# Patient Record
Sex: FEMALE | URBAN - METROPOLITAN AREA
[De-identification: names, ages, dates, MRNs, and addresses within clinical notes are randomized per-mention and may not be internally consistent; named-entity substitution may affect disease eponyms.]

---

## 2020-01-21 ENCOUNTER — APPOINTMENT (OUTPATIENT)
Age: 65
Setting detail: DERMATOLOGY
End: 2020-01-21

## 2020-01-21 VITALS — HEIGHT: 65 IN | WEIGHT: 215 LBS | RESPIRATION RATE: 16 BRPM

## 2020-01-21 DIAGNOSIS — L82.1 OTHER SEBORRHEIC KERATOSIS: ICD-10-CM

## 2020-01-21 PROCEDURE — OTHER COUNSELING: OTHER

## 2020-01-21 PROCEDURE — 99202 OFFICE O/P NEW SF 15 MIN: CPT

## 2020-01-21 ASSESSMENT — LOCATION DETAILED DESCRIPTION DERM
LOCATION DETAILED: RIGHT INFERIOR LATERAL FOREHEAD
LOCATION DETAILED: LEFT LATERAL TEMPLE
LOCATION DETAILED: RIGHT LATERAL ZYGOMA
LOCATION DETAILED: RIGHT INFERIOR FOREHEAD

## 2020-01-21 ASSESSMENT — LOCATION SIMPLE DESCRIPTION DERM
LOCATION SIMPLE: RIGHT FOREHEAD
LOCATION SIMPLE: LEFT TEMPLE
LOCATION SIMPLE: RIGHT ZYGOMA

## 2020-01-21 ASSESSMENT — LOCATION ZONE DERM: LOCATION ZONE: FACE

## 2020-09-29 ENCOUNTER — TRANSFERRED RECORDS (OUTPATIENT)
Dept: PHYSICAL THERAPY | Facility: CLINIC | Age: 65
End: 2020-09-29

## 2020-10-01 ENCOUNTER — THERAPY VISIT (OUTPATIENT)
Dept: PHYSICAL THERAPY | Facility: CLINIC | Age: 65
End: 2020-10-01
Payer: COMMERCIAL

## 2020-10-01 DIAGNOSIS — M53.3 SI (SACROILIAC) JOINT DYSFUNCTION: ICD-10-CM

## 2020-10-01 DIAGNOSIS — M51.369 DDD (DEGENERATIVE DISC DISEASE), LUMBAR: ICD-10-CM

## 2020-10-01 PROCEDURE — 97140 MANUAL THERAPY 1/> REGIONS: CPT | Mod: GP | Performed by: PHYSICAL THERAPIST

## 2020-10-01 PROCEDURE — 97110 THERAPEUTIC EXERCISES: CPT | Mod: GP | Performed by: PHYSICAL THERAPIST

## 2020-10-01 PROCEDURE — 97162 PT EVAL MOD COMPLEX 30 MIN: CPT | Mod: GP | Performed by: PHYSICAL THERAPIST

## 2020-10-01 NOTE — PROGRESS NOTES
[facility] Initial Evaluation  Subjective:  The history is provided by the patient. No  was used.   Therapist Generated HPI Evaluation  Problem details: 9/29/20 returned to the MD after diagnostics of her low back and L hip. MRI reviels Moderate arthritis and multilevel bulging discs..         Type of problem:  Lumbar.    This is a new condition.  Condition occurred with:  A fall/slip.    Patient reports pain:  Lumbar spine left and central lumbar spine.    Pain radiates to:  Gluteals left.     Associated symptoms:  Loss of motion/stiffness. Symptoms are exacerbated by twisting, sitting, walking and bending (transitional motions)  and relieved by rest and NSAID's.          Patient Health History  Shelby Alves being seen for 9/29/20.          Pain score: 0-5/10.  General health as reported by patient is good.  Pertinent medical history includes: diabetes, implanted device, osteoarthritis and overweight.   Red flags:  None as reported by patient.  Medical allergies: other. Other medical allergies details: prednisone.   Surgeries include:  Orthopedic surgery and other. Other surgery history details: B TKA, 3 C-sections, gallbladder and ovarian cyst removed.    Current medications:  Other, anti-inflammatory, high blood pressure medication and thyroid medication. Other medications details: insulin.    Current occupation is retired.                                       Objective:  Standing Alignment:        Lumbar:  Lordosis decr                           Lumbar/SI Evaluation  ROM:    AROM Lumbar:   Flexion:          Hands to feel - no pain  Ext:                    100%- no pain   Side Bend:        Left:  100% - no pain    Right:  100% - no pain  Rotation:           Left:     Right:   Side Glide:        Left:     Right:           Lumbar Myotomes:  Lumbar myotomes: no radicular sx reported.                  Neural Tension/Mobility:    Left side:  SLR positive.  Left  side:SLR w/DF  negative.     Lumbar Palpation:    Tenderness present at Left:    Erector Spinae and PSIS      Lumbar Provocation:      Left negative with:  PROM hip      SI joint/Sacrum:    Standing alignment: L iliac crest and PSIS high  March test: decreased L ant ilial rotation  Supine alignment: L LE appears long; ASIS low  Neg SI and hip compression  Prone alignment: L PSIS high  S/S consistent with a L ilial upslip and anterior rotation; corrected easily with MET                                                         General     ROS    Assessment/Plan:    Patient is a 65 year old female with lumbar and sacral complaints.    Patient has the following significant findings with corresponding treatment plan.                Diagnosis 1:  Lumbar DDD and SI dysfunction  Pain -  hot/cold therapy, manual therapy, splint/taping/bracing/orthotics and home program  Decreased ROM/flexibility - manual therapy and therapeutic exercise  Decreased strength - therapeutic exercise and therapeutic activities  Impaired balance - neuro re-education and therapeutic activities  Decreased proprioception - neuro re-education and therapeutic activities  Inflammation - cold therapy and self management/home program  Impaired muscle performance - neuro re-education  Decreased function - therapeutic activities  Impaired posture - neuro re-education  Instability -  Therapeutic Activity  Therapeutic Exercise    Therapy Evaluation Codes:   1) History comprised of:   Personal factors that impact the plan of care:      None.    Comorbidity factors that impact the plan of care are:      Diabetes, Osteoarthritis, Overweight and thyroid.     Medications impacting care: Anti-inflammatory, High blood pressure and insulin, thyroid.  2) Examination of Body Systems comprised of:   Body structures and functions that impact the plan of care:      Lumbar spine and Sacral illiac joint.   Activity limitations that impact the plan of care are:      Bending,  Lifting, Sitting, Squatting/kneeling, Stairs, Walking, Laying down and transitional motions.  3) Clinical presentation characteristics are:   Evolving/Changing.  4) Decision-Making    Moderate complexity using standardized patient assessment instrument and/or measureable assessment of functional outcome.  Cumulative Therapy Evaluation is: Moderate complexity.    Previous and current functional limitations:  (See Goal Flow Sheet for this information)    Short term and Long term goals: (See Goal Flow Sheet for this information)     Communication ability:  Patient appears to be able to clearly communicate and understand verbal and written communication and follow directions correctly.  Treatment Explanation - The following has been discussed with the patient:   RX ordered/plan of care  Anticipated outcomes  Possible risks and side effects  This patient would benefit from PT intervention to resume normal activities.   Rehab potential is good.    Frequency:  2 X week, once daily  Duration:  for 3 weeks tapering to 1 X a week over 12 weeks  Discharge Plan:  Achieve all LTG.  Independent in home treatment program.  Reach maximal therapeutic benefit.    Please refer to the daily flowsheet for treatment today, total treatment time and time spent performing 1:1 timed codes.

## 2020-10-01 NOTE — LETTER
[facility]  [address] [address] [place] [id number]  [phone]    2020    Re: Shelby Alves   :   1955  MRN:  [id number]   REFERRING PHYSICIAN:   Leandra Henao    [facility]    Date of Initial Evaluation:  10/1/2020  Visits:  Rxs Used: 1  Reason for Referral:     DDD (degenerative disc disease), lumbar  SI (sacroiliac) joint dysfunction    EVALUATION SUMMARY    [facility] Initial Evaluation  Subjective:  The history is provided by the patient. No  was used.   Therapist Generated HPI Evaluation  Problem details: 20 returned to the MD after diagnostics of her low back and L hip. MRI reviels Moderate arthritis and multilevel bulging discs..         Type of problem:  Lumbar.  This is a new condition.  Condition occurred with:  A fall/slip.  Patient reports pain:  Lumbar spine left and central lumbar spine.  Pain radiates to:  Gluteals left.   Associated symptoms:  Loss of motion/stiffness. Symptoms are exacerbated by twisting, sitting, walking and bending (transitional motions)  and relieved by rest and NSAID's.    Patient Health History  Shelby Alves being seen for 20.   Pain score: 0-5/10.  General health as reported by patient is good.  Pertinent medical history includes: diabetes, implanted device, osteoarthritis and overweight.   Red flags:  None as reported by patient.  Medical allergies: other. Other medical allergies details: prednisone.   Surgeries include:  Orthopedic surgery and other. Other surgery history details: B TKA, 3 C-sections, gallbladder and ovarian cyst removed.    Current medications:  Other, anti-inflammatory, high blood pressure medication and thyroid medication. Other medications details: insulin.    Current occupation is retired.                     Objective:  Standing Alignment:    Lumbar:  Lordosis decr    Lumbar/SI Evaluation  Re: Shelby Quintanilla  Long   :   1955, page 2    AROM:    AROM Lumbar:   Flexion:          Hands to feel - no pain  Ext:                    100%- no pain   Side Bend:        Left:  100% - no pain    Right:  100% - no pain  Rotation:           Left:     Right:   Side Glide:        Left:     Right:      Lumbar Myotomes:  Lumbar myotomes: no radicular sx reported.  Neural Tension/Mobility:    Left side:  SLR positive.  Left side:SLR w/DF  negative.   Lumbar Palpation:    Tenderness present at Left:    Erector Spinae and PSIS  Lumbar Provocation:    Left negative with:  PROM hip  SI joint/Sacrum:    Standing alignment: L iliac crest and PSIS high  March test: decreased L ant ilial rotation  Supine alignment: L LE appears long; ASIS low  Neg SI and hip compression  Prone alignment: L PSIS high  S/S consistent with a L ilial upslip and anterior rotation; corrected easily with MET    Assessment/Plan:    Patient is a 65 year old female with lumbar and sacral complaints.    Patient has the following significant findings with corresponding treatment plan.                Diagnosis 1:  Lumbar DDD and SI dysfunction  Pain -  hot/cold therapy, manual therapy, splint/taping/bracing/orthotics and home program  Decreased ROM/flexibility - manual therapy and therapeutic exercise  Decreased strength - therapeutic exercise and therapeutic activities  Impaired balance - neuro re-education and therapeutic activities  Decreased proprioception - neuro re-education and therapeutic activities  Inflammation - cold therapy and self management/home program  Impaired muscle performance - neuro re-education  Decreased function - therapeutic activities  Impaired posture - neuro re-education  Instability -  Therapeutic Activity  Therapeutic Exercise    Therapy Evaluation Codes:   1) History comprised of:   Personal factors that impact the plan of care:      None.    Comorbidity factors that impact the plan of care are:      Diabetes, Osteoarthritis,  Overweight and thyroid.     Medications impacting care: Anti-inflammatory, High blood pressure and insulin, thyroid.  2) Examination of Body Systems comprised of:   Body structures and functions that impact the plan of care:      Lumbar spine and Sacral illiac joint.   Activity limitations that impact the plan of care are:      Re: Shelby Alves   :   1955, page 3     Bending, Lifting, Sitting, Squatting/kneeling, Stairs, Walking, Laying down and transitional motions.  3) Clinical presentation characteristics are:   Evolving/Changing.  4) Decision-Making    Moderate complexity using standardized patient assessment instrument and/or measureable assessment of functional outcome.  Cumulative Therapy Evaluation is: Moderate complexity.  Previous and current functional limitations:  (See Goal Flow Sheet for this information)    Short term and Long term goals: (See Goal Flow Sheet for this information)   Communication ability:  Patient appears to be able to clearly communicate and understand verbal and written communication and follow directions correctly.  Treatment Explanation - The following has been discussed with the patient:   RX ordered/plan of care  Anticipated outcomes  Possible risks and side effects  This patient would benefit from PT intervention to resume normal activities.   Rehab potential is good.    Frequency:  2 X week, once daily  Duration:  for 3 weeks tapering to 1 X a week over 12 weeks  Discharge Plan:  Achieve all LTG.  Independent in home treatment program.  Reach maximal therapeutic benefit.    Thank you for your referral.    INQUIRIES  Therapist: Brianna Tyler, PT, ATC   [address] [address]  Phone: [phone]  Fax: [phone]

## 2020-10-05 ENCOUNTER — THERAPY VISIT (OUTPATIENT)
Dept: PHYSICAL THERAPY | Facility: CLINIC | Age: 65
End: 2020-10-05
Payer: COMMERCIAL

## 2020-10-05 DIAGNOSIS — M53.3 SI (SACROILIAC) JOINT DYSFUNCTION: ICD-10-CM

## 2020-10-05 DIAGNOSIS — M51.369 DDD (DEGENERATIVE DISC DISEASE), LUMBAR: ICD-10-CM

## 2020-10-05 PROCEDURE — 97035 APP MDLTY 1+ULTRASOUND EA 15: CPT | Mod: GP | Performed by: PHYSICAL THERAPIST

## 2020-10-05 PROCEDURE — 97140 MANUAL THERAPY 1/> REGIONS: CPT | Mod: GP | Performed by: PHYSICAL THERAPIST

## 2020-10-05 PROCEDURE — 97112 NEUROMUSCULAR REEDUCATION: CPT | Mod: GP | Performed by: PHYSICAL THERAPIST

## 2020-10-08 ENCOUNTER — THERAPY VISIT (OUTPATIENT)
Dept: PHYSICAL THERAPY | Facility: CLINIC | Age: 65
End: 2020-10-08
Payer: COMMERCIAL

## 2020-10-08 ENCOUNTER — TRANSFERRED RECORDS (OUTPATIENT)
Dept: PHYSICAL THERAPY | Facility: CLINIC | Age: 65
End: 2020-10-08

## 2020-10-08 DIAGNOSIS — M51.369 DDD (DEGENERATIVE DISC DISEASE), LUMBAR: ICD-10-CM

## 2020-10-08 DIAGNOSIS — M53.3 SI (SACROILIAC) JOINT DYSFUNCTION: ICD-10-CM

## 2020-10-08 PROCEDURE — 97035 APP MDLTY 1+ULTRASOUND EA 15: CPT | Mod: GP | Performed by: PHYSICAL THERAPIST

## 2020-10-08 PROCEDURE — 97112 NEUROMUSCULAR REEDUCATION: CPT | Mod: GP | Performed by: PHYSICAL THERAPIST

## 2020-10-08 PROCEDURE — 97140 MANUAL THERAPY 1/> REGIONS: CPT | Mod: GP | Performed by: PHYSICAL THERAPIST

## 2020-10-12 ENCOUNTER — THERAPY VISIT (OUTPATIENT)
Dept: PHYSICAL THERAPY | Facility: CLINIC | Age: 65
End: 2020-10-12
Payer: COMMERCIAL

## 2020-10-12 DIAGNOSIS — M53.3 SI (SACROILIAC) JOINT DYSFUNCTION: ICD-10-CM

## 2020-10-12 DIAGNOSIS — M51.369 DDD (DEGENERATIVE DISC DISEASE), LUMBAR: ICD-10-CM

## 2020-10-12 PROCEDURE — 97035 APP MDLTY 1+ULTRASOUND EA 15: CPT | Mod: GP | Performed by: PHYSICAL THERAPIST

## 2020-10-12 PROCEDURE — 97140 MANUAL THERAPY 1/> REGIONS: CPT | Mod: GP | Performed by: PHYSICAL THERAPIST

## 2020-10-12 PROCEDURE — 97110 THERAPEUTIC EXERCISES: CPT | Mod: GP | Performed by: PHYSICAL THERAPIST

## 2020-10-15 ENCOUNTER — THERAPY VISIT (OUTPATIENT)
Dept: PHYSICAL THERAPY | Facility: CLINIC | Age: 65
End: 2020-10-15
Payer: COMMERCIAL

## 2020-10-15 DIAGNOSIS — M53.3 SI (SACROILIAC) JOINT DYSFUNCTION: ICD-10-CM

## 2020-10-15 DIAGNOSIS — M79.671 PAIN OF RIGHT HEEL: ICD-10-CM

## 2020-10-15 DIAGNOSIS — M51.369 DDD (DEGENERATIVE DISC DISEASE), LUMBAR: ICD-10-CM

## 2020-10-15 PROCEDURE — 97161 PT EVAL LOW COMPLEX 20 MIN: CPT | Mod: GP | Performed by: PHYSICAL THERAPIST

## 2020-10-15 PROCEDURE — 97110 THERAPEUTIC EXERCISES: CPT | Mod: GP | Performed by: PHYSICAL THERAPIST

## 2020-10-15 PROCEDURE — 97140 MANUAL THERAPY 1/> REGIONS: CPT | Mod: GP | Performed by: PHYSICAL THERAPIST

## 2020-10-15 NOTE — PROGRESS NOTES
[facility] Initial Evaluation  Subjective:  The history is provided by the patient. No  was used.   Therapist Generated HPI Evaluation  Problem details: 9/12/20 pt fell/slid down her stairs at home and injured her R heel. X ray revealed bone spur on bottom of foot..         Type of problem:  Right foot.    This is a new condition.  Condition occurred with:  A fall/slip.  Where condition occurred: at home.  Patient reports pain:  Sub calcaneus.  Pain is described as aching and is constant.  Pain radiates to:  Lower leg. Pain is worse in the P.M..  Since onset symptoms are unchanged.  Symptoms are exacerbated by standing and walking (4/10 pain with walking)  and relieved by rest, ice and NSAID's.  Special tests included:  X-ray.        Patient Health History  Shelby Alves being seen for R heel pain.     Problem began: 9/12/2020.   Problem occurred: hit heel on post as she fell/slid down stairs   Pain is reported as 3/10 on pain scale.  General health as reported by patient is good.  Pertinent medical history includes: diabetes, implanted device, osteoarthritis and overweight.   Red flags:  None as reported by patient.  Medical allergies: other. Other medical allergies details: prednisone.   Surgeries include:  Orthopedic surgery and other. Other surgery history details: B TKA, 3 C-sections, gallbladder and ovarian cyst removed.  .    Current medications:  Anti-inflammatory, high blood pressure medication, thyroid medication and other. Other medications details: insulin.    Current occupation is retired.                                       Objective:  Standing Alignment:                Ankle/Foot:  Pes planus R    Gait:  Occasional antalgic gait noted              Ankle/Foot Evaluation  ROM:  Prom wnl ankle: Moderate decrease in R great toe passive extension.  AROM:    Dorsiflexion:  Left:   4  Right:   5  Plantarflexion: Left:     Right:  50  Inversion: Left:       Right:  35  Eversion:     Right:  25      PROM:    Dorsiflexion: Left:        Right:   10                 Strength:    Dorsiflexion:  Right: 5/5   Pain:            Anterior Tibialis:Right: 5/5  Pain:  Posterior Tibialis: Right: 5/5  Pain:  Peroneals: Right: 5/5  Pain:  Extensor Digitorum: Right: 5/5  Pain:      LIGAMENT TESTING: normal                PALPATION:     Right ankle tenderness present at:   medial calcaneal (and subcalcaneous)  EDEMA: normal          MOBILITY TESTING: normal                                                                General     ROS    Assessment/Plan:    Patient is a 65 year old female with right side ankle complaints.    Patient has the following significant findings with corresponding treatment plan.                Diagnosis 1:  Contusion to R calcaneous  Pain -  hot/cold therapy, US, manual therapy and home program  Decreased ROM/flexibility - manual therapy and therapeutic exercise  Decreased strength - therapeutic exercise and therapeutic activities  Impaired balance - neuro re-education and therapeutic activities  Decreased proprioception - neuro re-education and therapeutic activities  Inflammation - cold therapy, US and self management/home program  Impaired gait - gait training  Impaired muscle performance - neuro re-education  Decreased function - therapeutic activities    Therapy Evaluation Codes:   1) History comprised of:   Personal factors that impact the plan of care:      None.    Comorbidity factors that impact the plan of care are:      Diabetes, Implanted device, Osteoarthritis and Overweight.     Medications impacting care: Anti-inflammatory, High blood pressure and thyroid and insulin.  2) Examination of Body Systems comprised of:   Body structures and functions that impact the plan of care:      Ankle.   Activity limitations that impact the plan of care are:      Cooking, Jumping, Sports, Standing and Walking.  3) Clinical presentation characteristics  are:   Stable/Uncomplicated.  4) Decision-Making    Low complexity using standardized patient assessment instrument and/or measureable assessment of functional outcome.  Cumulative Therapy Evaluation is: Low complexity.    Previous and current functional limitations:  (See Goal Flow Sheet for this information)    Short term and Long term goals: (See Goal Flow Sheet for this information)     Communication ability:  Patient appears to be able to clearly communicate and understand verbal and written communication and follow directions correctly.  Treatment Explanation - The following has been discussed with the patient:   RX ordered/plan of care  Anticipated outcomes  Possible risks and side effects  This patient would benefit from PT intervention to resume normal activities.   Rehab potential is good.    Frequency:  1 X week, once daily  Duration:  for 12 weeks  Discharge Plan:  Achieve all LTG.  Independent in home treatment program.  Reach maximal therapeutic benefit.    Please refer to the daily flowsheet for treatment today, total treatment time and time spent performing 1:1 timed codes.

## 2020-10-15 NOTE — LETTER
[facility]  [address] [address] [place] [address]  [phone]    October 15, 2020    Re: Shelby Alves   :   1955  MRN:  [id number]   REFERRING PHYSICIAN:   Leandra Henao    [facility]  Date of Initial Evaluation:  10/15/2020  Visits:   1  Reason for Referral:     DDD (degenerative disc disease), lumbar  SI (sacroiliac) joint dysfunction  Pain of right heel    EVALUATION SUMMARY    [facility] Initial Evaluation  Subjective:  The history is provided by the patient. No  was used.   Therapist Generated HPI Evaluation  Problem details: 20 pt fell/slid down her stairs at home and injured her R heel. X ray revealed bone spur on bottom of foot..         Type of problem:  Right foot.  This is a new condition.  Condition occurred with:  A fall/slip.  Where condition occurred: at home.  Patient reports pain:  Sub calcaneus.  Pain is described as aching and is constant.  Pain radiates to:  Lower leg. Pain is worse in the P.M..  Since onset symptoms are unchanged.  Symptoms are exacerbated by standing and walking (4/10 pain with walking)  and relieved by rest, ice and NSAID's.  Special tests included:  X-ray.    Patient Health History  Shelby Alves being seen for R heel pain.   Problem began: 2020.   Problem occurred: hit heel on post as she fell/slid down stairs   Pain is reported as 3/10 on pain scale.  General health as reported by patient is good.  Pertinent medical history includes: diabetes, implanted device, osteoarthritis and overweight.   Red flags:  None as reported by patient.  Medical allergies: other. Other medical allergies details: prednisone.   Surgeries include:  Orthopedic surgery and other. Other surgery history details: B TKA, 3 C-sections, gallbladder and ovarian cyst removed.  .    Current medications:  Anti-inflammatory, high blood pressure medication, thyroid medication and  other. Other medications details: insulin.    Current occupation is retired.   Re: Shelby Quintanilla Long - Page 2    Objective:  Standing Alignment:    Ankle/Foot:  Pes planus R  Gait:  Occasional antalgic gait noted  Ankle/Foot Evaluation  ROM:  Prom wnl ankle: Moderate decrease in R great toe passive extension.  AROM:    Dorsiflexion:  Left:   4  Right:   5  Plantarflexion: Left:     Right:  50  Inversion: Left:      Right:  35  Eversion:     Right:  25  PROM:    Dorsiflexion: Left:        Right:   10   Strength:    Dorsiflexion:  Right: 5/5   Pain:  Anterior Tibialis:Right: 5/5  Pain:  Posterior Tibialis: Right: 5/5  Pain:  Peroneals: Right: 5/5  Pain:  Extensor Digitorum: Right: 5/5  Pain:  LIGAMENT TESTING: normal  PALPATION:   Right ankle tenderness present at:   medial calcaneal (and subcalcaneous)  EDEMA: normal  MOBILITY TESTING: normal    Assessment/Plan:    Patient is a 65 year old female with right side ankle complaints.    Patient has the following significant findings with corresponding treatment plan.                Diagnosis 1:  Contusion to R calcaneous  Pain -  hot/cold therapy, US, manual therapy and home program  Decreased ROM/flexibility - manual therapy and therapeutic exercise  Decreased strength - therapeutic exercise and therapeutic activities  Impaired balance - neuro re-education and therapeutic activities  Decreased proprioception - neuro re-education and therapeutic activities  Inflammation - cold therapy, US and self management/home program  Impaired gait - gait training  Impaired muscle performance - neuro re-education  Decreased function - therapeutic activities    Therapy Evaluation Codes:   1) History comprised of:   Personal factors that impact the plan of care:      None.    Comorbidity factors that impact the plan of care are:      Diabetes, Implanted device, Osteoarthritis and Overweight.     Medications impacting care: Anti-inflammatory, High blood pressure and thyroid and  insulin.  2) Examination of Body Systems comprised of:   Body structures and functions that impact the plan of care:      Ankle.   Activity limitations that impact the plan of care are:      Cooking, Jumping, Sports, Standing and Walking.  Re: Shelby Alves - Page 3    3) Clinical presentation characteristics are:   Stable/Uncomplicated.  4) Decision-Making    Low complexity using standardized patient assessment instrument and/or measureable assessment of functional outcome.  Cumulative Therapy Evaluation is: Low complexity.    Previous and current functional limitations:  (See Goal Flow Sheet for this information)    Short term and Long term goals: (See Goal Flow Sheet for this information)     Communication ability:  Patient appears to be able to clearly communicate and understand verbal and written communication and follow directions correctly.  Treatment Explanation - The following has been discussed with the patient:   RX ordered/plan of care  Anticipated outcomes  Possible risks and side effects  This patient would benefit from PT intervention to resume normal activities.   Rehab potential is good.    Frequency:  1 X week, once daily  Duration:  for 12 weeks  Discharge Plan:  Achieve all LTG.  Independent in home treatment program.  Reach maximal therapeutic benefit.    Thank you for your referral.    INQUIRIES  Therapist: Brianna Tyler, PT, ATC  [address] [address]  Phone: [phone]  Fax: [phone]

## 2020-10-19 ENCOUNTER — THERAPY VISIT (OUTPATIENT)
Dept: PHYSICAL THERAPY | Facility: CLINIC | Age: 65
End: 2020-10-19
Payer: COMMERCIAL

## 2020-10-19 DIAGNOSIS — M51.369 DDD (DEGENERATIVE DISC DISEASE), LUMBAR: ICD-10-CM

## 2020-10-19 DIAGNOSIS — M53.3 SI (SACROILIAC) JOINT DYSFUNCTION: ICD-10-CM

## 2020-10-19 PROCEDURE — 97140 MANUAL THERAPY 1/> REGIONS: CPT | Mod: GP | Performed by: PHYSICAL THERAPIST

## 2020-10-19 PROCEDURE — 97112 NEUROMUSCULAR REEDUCATION: CPT | Mod: GP | Performed by: PHYSICAL THERAPIST

## 2020-10-22 ENCOUNTER — THERAPY VISIT (OUTPATIENT)
Dept: PHYSICAL THERAPY | Facility: CLINIC | Age: 65
End: 2020-10-22
Payer: COMMERCIAL

## 2020-10-22 DIAGNOSIS — M51.369 DDD (DEGENERATIVE DISC DISEASE), LUMBAR: ICD-10-CM

## 2020-10-22 DIAGNOSIS — M53.3 SI (SACROILIAC) JOINT DYSFUNCTION: ICD-10-CM

## 2020-10-22 DIAGNOSIS — M79.671 PAIN OF RIGHT HEEL: ICD-10-CM

## 2020-10-22 PROCEDURE — 97140 MANUAL THERAPY 1/> REGIONS: CPT | Mod: GP | Performed by: PHYSICAL THERAPIST

## 2020-10-22 PROCEDURE — 97110 THERAPEUTIC EXERCISES: CPT | Mod: GP | Performed by: PHYSICAL THERAPIST

## 2020-10-22 PROCEDURE — 97035 APP MDLTY 1+ULTRASOUND EA 15: CPT | Mod: GP | Performed by: PHYSICAL THERAPIST

## 2020-10-26 ENCOUNTER — THERAPY VISIT (OUTPATIENT)
Dept: PHYSICAL THERAPY | Facility: CLINIC | Age: 65
End: 2020-10-26
Payer: COMMERCIAL

## 2020-10-26 DIAGNOSIS — M51.369 DDD (DEGENERATIVE DISC DISEASE), LUMBAR: ICD-10-CM

## 2020-10-26 DIAGNOSIS — M79.671 PAIN OF RIGHT HEEL: ICD-10-CM

## 2020-10-26 DIAGNOSIS — M53.3 SI (SACROILIAC) JOINT DYSFUNCTION: ICD-10-CM

## 2020-10-26 PROCEDURE — 97110 THERAPEUTIC EXERCISES: CPT | Mod: GP | Performed by: PHYSICAL THERAPIST

## 2020-10-26 PROCEDURE — 97140 MANUAL THERAPY 1/> REGIONS: CPT | Mod: GP | Performed by: PHYSICAL THERAPIST

## 2020-10-26 PROCEDURE — 97035 APP MDLTY 1+ULTRASOUND EA 15: CPT | Mod: GP | Performed by: PHYSICAL THERAPIST

## 2020-10-26 NOTE — PROGRESS NOTES
Subjective:  HPI  Physical Exam                    Objective:  System    Physical Exam    General     ROS    Assessment/Plan:    PROGRESS  REPORT    Progress reporting period is from 10/15/20 to 10/26/20.       SUBJECTIVE  Subjective changes noted by patient:  She reports feeling 80% of normal. C/C pain with standing up. Foot is feeling much better.     Current pain level is 0-3/10.     Initial Pain level: 3/10.   Changes in function:  Yes (See Goal flowsheet attached for changes in current functional level)  Adverse reaction to treatment or activity: None    OBJECTIVE  -Pelvic alignment and mobility normal  -Lumbar AROM normal and pain free  -No radicular sx reported  -Mild increase in tone/tenderess to L LB  -HEP focusing on mild to mod back stabilization exercises and posture training  -Managing foot well with proper shoes        ASSESSMENT/PLAN  Updated problem list and treatment plan: Diagnosis 1:  LBP and foot pain  Pain -  hot/cold therapy, self management, education  Decreased ROM/flexibility - manual therapy and therapeutic exercise  Decreased strength - therapeutic exercise and therapeutic activities  Impaired gait - gait training  Decreased function - therapeutic activities  STG/LTGs have been met or progress has been made towards goals:  Yes (See Goal flow sheet completed today.)  Assessment of Progress: The patient's condition is improving.  Self Management Plans:  Patient has been instructed in a home treatment program.  Patient is independent in a home treatment program.  Patient  has been instructed in self management of symptoms.  Patient is independent in self management of symptoms.  I have re-evaluated this patient and find that the nature, scope, duration and intensity of the therapy is appropriate for the medical condition of the patient.  Shelby continues to require the following intervention to meet STG and LTG's:  PT intervention is no longer required to meet  STG/LTG.    Recommendations:  Pt is ready to be independent with with HEP.  If problems arise, she will return.      Please refer to the daily flowsheet for treatment today, total treatment time and time spent performing 1:1 timed codes.

## 2020-10-26 NOTE — LETTER
[facility]  [address] [address] [place] [address]  [phone]    2020    Re: Shelby Alves   :   1955  MRN:  [id number]   REFERRING PHYSICIAN:   Leandra Henao    [facility]    Date of Initial Evaluation:  10/1/2020  Visits:  Rxs Used: 7  Reason for Referral:     Pain of right heel  DDD (degenerative disc disease), lumbar  SI (sacroiliac) joint dysfunction    PROGRESS  REPORT  Progress reporting period is from 10/15/20 to 10/26/20.       SUBJECTIVE  Subjective changes noted by patient:  She reports feeling 80% of normal. C/C pain with standing up. Foot is feeling much better.  Current pain level is 0-3/10.   Initial Pain level: 3/10. Changes in function:  Yes (See Goal flowsheet attached for changes in current functional level).  Adverse reaction to treatment or activity: None    OBJECTIVE  -Pelvic alignment and mobility normal  -Lumbar AROM normal and pain free  -No radicular sx reported  -Mild increase in tone/tenderess to L LB  -HEP focusing on mild to mod back stabilization exercises and posture training  -Managing foot well with proper shoes    ASSESSMENT/PLAN  Updated problem list and treatment plan: Diagnosis 1:  LBP and foot pain  Pain -  hot/cold therapy, self management, education  Decreased ROM/flexibility - manual therapy and therapeutic exercise  Decreased strength - therapeutic exercise and therapeutic activities  Impaired gait - gait training  Decreased function - therapeutic activities  STG/LTGs have been met or progress has been made towards goals:  Yes (See Goal flow sheet completed today.)  Assessment of Progress: The patient's condition is improving.  Self Management Plans:  Patient has been instructed in a home treatment program.  Patient is independent in a home treatment program.  Patient  has been instructed in self management of symptoms.  Patient is independent in self management of symptoms.  I have  re-evaluated this patient and find that the nature, scope, duration and intensity of the therapy is appropriate for the medical condition of the patient.  Shelby continues to require the following intervention to meet STG and LTG's:  PT intervention is no longer required to meet STG/LTG.    Recommendations:  Pt is ready to be independent with with HEP.  If problems arise, she will return.    Thank you for your referral.    INQUIRIES  Therapist: Brianna Tyler, PT,ATC   [address] [address]  Phone: [phone]  Fax: [phone]

## 2020-11-09 ENCOUNTER — THERAPY VISIT (OUTPATIENT)
Dept: PHYSICAL THERAPY | Facility: CLINIC | Age: 65
End: 2020-11-09
Payer: COMMERCIAL

## 2020-11-09 DIAGNOSIS — M53.3 SI (SACROILIAC) JOINT DYSFUNCTION: ICD-10-CM

## 2020-11-09 DIAGNOSIS — M51.369 DDD (DEGENERATIVE DISC DISEASE), LUMBAR: ICD-10-CM

## 2020-11-09 PROCEDURE — 97140 MANUAL THERAPY 1/> REGIONS: CPT | Mod: GP | Performed by: PHYSICAL THERAPIST

## 2020-11-09 PROCEDURE — 97110 THERAPEUTIC EXERCISES: CPT | Mod: GP | Performed by: PHYSICAL THERAPIST

## 2020-11-09 PROCEDURE — 97112 NEUROMUSCULAR REEDUCATION: CPT | Mod: GP | Performed by: PHYSICAL THERAPIST

## 2020-11-09 NOTE — LETTER
[facility]  [address] [address] [place] [address]  [phone]    November 10, 2020    Re: Shelby Alves   :   1955  MRN:  [id number]   REFERRING PHYSICIAN:   Leandra Henao    [facility]    Date of Initial Evaluation:  10/1/2020  Visits:  Rxs Used: 8  Reason for Referral:     DDD (degenerative disc disease), lumbar  SI (sacroiliac) joint dysfunction    PROGRESS  REPORT  Progress reporting period is from 10/26/20 to 20.       SUBJECTIVE  Subjective changes noted by patient:  Friday night, Shelby felt something shift in her LB and has been in severe pain since.     Current pain level is 10/10  .    Initial Pain level: 3/10. Changes in function:  Yes (See Goal flowsheet attached for changes in current functional level).  Adverse reaction to treatment or activity: None    OBJECTIVE  -Lumbar AROM: flex hands to mid lower leg with sharp L LBP, ext 50% no pain, L SB 50% sharp L LBP, R %  -L LE neuro MMT all ; pain with resisted hip flexion  -Increased tone to L>R LB  -SI screen:  Standing=L iliac crest and PSIS high and decreased L posterio ilial rotation with march test  Supine=L LE appears long and L ASIS low; + SI compression  S/s consistent with a L ilial upslip with anterior rotation  Corrected easily with MET with sig pain reduction     ASSESSMENT/PLAN  Updated problem list and treatment plan: Diagnosis 1:  LBP/SI dysfunction  Pain -  hot/cold therapy, manual therapy and home program  Decreased ROM/flexibility - manual therapy and therapeutic exercise  Decreased strength - therapeutic exercise and therapeutic activities  Impaired balance - neuro re-education and therapeutic activities  Decreased proprioception - neuro re-education and therapeutic activities  Inflammation - cold therapy and self management/home program  Impaired muscle performance - neuro re-education  Decreased function - therapeutic activities  Impaired posture -  neuro re-education  Instability -  Therapeutic Activity  Therapeutic Exercise  STG/LTGs have been met or progress has been made towards goals:  Yes (See Goal flow sheet completed today.)  Assessment of Progress: The patient's condition has exacerbated.  Self Management Plans:  Patient has been instructed in a home treatment program.  Patient  has been instructed in self management of symptoms.  I have re-evaluated this patient and find that the nature, scope, duration and intensity of the therapy is appropriate for the medical condition of the patient.  Shelby continues to require the following intervention to meet STG and LTG's:  PT    Recommendations:  Resume PT 1-2x/week.    Thank you for your referral.    INQUIRIES  Therapist: Brianna Tyler, PT, ATC  [address] [address]  Phone: [phone]  Fax: [phone]

## 2020-11-09 NOTE — PROGRESS NOTES
Subjective:  HPI  Physical Exam                    Objective:  System    Physical Exam    General     ROS    Assessment/Plan:    PROGRESS  REPORT    Progress reporting period is from 10/26/20 to 11/9/20.       SUBJECTIVE  Subjective changes noted by patient:  Friday night, Shelby felt something shift in her LB and has been in severe pain since.       Current pain level is 10/10  .    Initial Pain level: 3/10.   Changes in function:  Yes (See Goal flowsheet attached for changes in current functional level)  Adverse reaction to treatment or activity: None    OBJECTIVE  -Lumbar AROM: flex hands to mid lower leg with sharp L LBP, ext 50% no pain, L SB 50% sharp L LBP, R %  -L LE neuro MMT all 5/5; pain with resisted hip flexion  -Increased tone to L>R LB  -SI screen:  Standing=L iliac crest and PSIS high and decreased L posterio ilial rotation with march test  Supine=L LE appears long and L ASIS low; + SI compression  S/s consistent with a L ilial upslip with anterior rotation  Corrected easily with MET with sig pain reduction           ASSESSMENT/PLAN  Updated problem list and treatment plan: Diagnosis 1:  LBP/SI dysfunction  Pain -  hot/cold therapy, manual therapy and home program  Decreased ROM/flexibility - manual therapy and therapeutic exercise  Decreased strength - therapeutic exercise and therapeutic activities  Impaired balance - neuro re-education and therapeutic activities  Decreased proprioception - neuro re-education and therapeutic activities  Inflammation - cold therapy and self management/home program  Impaired muscle performance - neuro re-education  Decreased function - therapeutic activities  Impaired posture - neuro re-education  Instability -  Therapeutic Activity  Therapeutic Exercise  STG/LTGs have been met or progress has been made towards goals:  Yes (See Goal flow sheet completed today.)  Assessment of Progress: The patient's condition has exacerbated.  Self Management Plans:  Patient  has been instructed in a home treatment program.  Patient  has been instructed in self management of symptoms.  I have re-evaluated this patient and find that the nature, scope, duration and intensity of the therapy is appropriate for the medical condition of the patient.  Shelby continues to require the following intervention to meet STG and LTG's:  PT    Recommendations:  Resume PT 1-2x/week.    Please refer to the daily flowsheet for treatment today, total treatment time and time spent performing 1:1 timed codes.

## 2020-11-13 ENCOUNTER — TRANSFERRED RECORDS (OUTPATIENT)
Dept: PHYSICAL THERAPY | Facility: CLINIC | Age: 65
End: 2020-11-13

## 2020-11-17 ENCOUNTER — THERAPY VISIT (OUTPATIENT)
Dept: PHYSICAL THERAPY | Facility: CLINIC | Age: 65
End: 2020-11-17
Payer: COMMERCIAL

## 2020-11-17 DIAGNOSIS — M53.3 SI (SACROILIAC) JOINT DYSFUNCTION: ICD-10-CM

## 2020-11-17 DIAGNOSIS — M51.369 DDD (DEGENERATIVE DISC DISEASE), LUMBAR: ICD-10-CM

## 2020-11-17 PROCEDURE — 97110 THERAPEUTIC EXERCISES: CPT | Mod: GP | Performed by: PHYSICAL THERAPIST

## 2020-11-17 PROCEDURE — 97140 MANUAL THERAPY 1/> REGIONS: CPT | Mod: GP | Performed by: PHYSICAL THERAPIST

## 2020-11-17 PROCEDURE — 97112 NEUROMUSCULAR REEDUCATION: CPT | Mod: GP | Performed by: PHYSICAL THERAPIST

## 2020-11-19 ENCOUNTER — THERAPY VISIT (OUTPATIENT)
Dept: PHYSICAL THERAPY | Facility: CLINIC | Age: 65
End: 2020-11-19
Payer: COMMERCIAL

## 2020-11-19 DIAGNOSIS — M51.369 DDD (DEGENERATIVE DISC DISEASE), LUMBAR: ICD-10-CM

## 2020-11-19 DIAGNOSIS — M53.3 SI (SACROILIAC) JOINT DYSFUNCTION: ICD-10-CM

## 2020-11-19 PROCEDURE — 97110 THERAPEUTIC EXERCISES: CPT | Mod: GP | Performed by: PHYSICAL THERAPIST

## 2020-11-19 PROCEDURE — 97112 NEUROMUSCULAR REEDUCATION: CPT | Mod: GP | Performed by: PHYSICAL THERAPIST

## 2020-11-19 PROCEDURE — 97140 MANUAL THERAPY 1/> REGIONS: CPT | Mod: GP | Performed by: PHYSICAL THERAPIST

## 2020-11-23 ENCOUNTER — THERAPY VISIT (OUTPATIENT)
Dept: PHYSICAL THERAPY | Facility: CLINIC | Age: 65
End: 2020-11-23
Payer: COMMERCIAL

## 2020-11-23 DIAGNOSIS — M51.369 DDD (DEGENERATIVE DISC DISEASE), LUMBAR: ICD-10-CM

## 2020-11-23 DIAGNOSIS — M53.3 SI (SACROILIAC) JOINT DYSFUNCTION: ICD-10-CM

## 2020-11-23 PROCEDURE — 97140 MANUAL THERAPY 1/> REGIONS: CPT | Mod: GP | Performed by: PHYSICAL THERAPIST

## 2020-11-23 PROCEDURE — 97110 THERAPEUTIC EXERCISES: CPT | Mod: GP | Performed by: PHYSICAL THERAPIST

## 2020-11-23 PROCEDURE — 97112 NEUROMUSCULAR REEDUCATION: CPT | Mod: GP | Performed by: PHYSICAL THERAPIST

## 2020-11-30 ENCOUNTER — THERAPY VISIT (OUTPATIENT)
Dept: PHYSICAL THERAPY | Facility: CLINIC | Age: 65
End: 2020-11-30
Payer: COMMERCIAL

## 2020-11-30 DIAGNOSIS — M53.3 SI (SACROILIAC) JOINT DYSFUNCTION: ICD-10-CM

## 2020-11-30 DIAGNOSIS — M51.369 DDD (DEGENERATIVE DISC DISEASE), LUMBAR: ICD-10-CM

## 2020-11-30 PROCEDURE — 97110 THERAPEUTIC EXERCISES: CPT | Mod: GP | Performed by: PHYSICAL THERAPIST

## 2020-11-30 PROCEDURE — 97140 MANUAL THERAPY 1/> REGIONS: CPT | Mod: GP | Performed by: PHYSICAL THERAPIST

## 2020-12-09 ENCOUNTER — THERAPY VISIT (OUTPATIENT)
Dept: PHYSICAL THERAPY | Facility: CLINIC | Age: 65
End: 2020-12-09
Payer: COMMERCIAL

## 2020-12-09 DIAGNOSIS — M51.369 DDD (DEGENERATIVE DISC DISEASE), LUMBAR: ICD-10-CM

## 2020-12-09 DIAGNOSIS — M53.3 SI (SACROILIAC) JOINT DYSFUNCTION: ICD-10-CM

## 2020-12-09 PROCEDURE — 97110 THERAPEUTIC EXERCISES: CPT | Mod: GP | Performed by: PHYSICAL THERAPIST

## 2020-12-09 PROCEDURE — 97112 NEUROMUSCULAR REEDUCATION: CPT | Mod: GP | Performed by: PHYSICAL THERAPIST

## 2020-12-09 PROCEDURE — 97140 MANUAL THERAPY 1/> REGIONS: CPT | Mod: GP | Performed by: PHYSICAL THERAPIST

## 2020-12-22 ENCOUNTER — THERAPY VISIT (OUTPATIENT)
Dept: PHYSICAL THERAPY | Facility: CLINIC | Age: 65
End: 2020-12-22
Payer: COMMERCIAL

## 2020-12-22 DIAGNOSIS — M53.3 SI (SACROILIAC) JOINT DYSFUNCTION: ICD-10-CM

## 2020-12-22 DIAGNOSIS — M51.369 DDD (DEGENERATIVE DISC DISEASE), LUMBAR: ICD-10-CM

## 2020-12-22 PROCEDURE — 97110 THERAPEUTIC EXERCISES: CPT | Mod: GP | Performed by: PHYSICAL THERAPIST

## 2020-12-22 PROCEDURE — 97530 THERAPEUTIC ACTIVITIES: CPT | Mod: GP | Performed by: PHYSICAL THERAPIST

## 2020-12-22 PROCEDURE — 97140 MANUAL THERAPY 1/> REGIONS: CPT | Mod: GP | Performed by: PHYSICAL THERAPIST

## 2020-12-22 NOTE — LETTER
[facility]  [address] [address] [place] [address]  [phone]    2020    Re: Shelby Alves   :   1955  MRN:  [id number]   REFERRING PHYSICIAN:   Leandra Henao    [facility]    Date of Initial Evaluation: 2020  Visits:  Rxs Used: 14  Reason for Referral:     DDD (degenerative disc disease), lumbar  SI (sacroiliac) joint dysfunction    EVALUATION SUMMARY    Assessment/Plan:    PROGRESS  REPORT    Progress reporting period is from 20 to 20.       SUBJECTIVE  Subjective changes noted by patient:  Random SI instability continues to occur.   morning she got a leg cramp, jumped out of bed, and felt the SI shift out.  Sharp pain at first but it has eased some.       Current pain level is 2-3/10  .    Initial Pain level: 3/10.   Changes in function:  Yes (See Goal flowsheet attached for changes in current functional level)  Adverse reaction to treatment or activity: None    OBJECTIVE  -Lumbar AROM normal with L glut pain with extension and R SB  -+ L ilial upslip and anterior rotation again; corrects easily with MET and instructed her  in this technique today  -Sig tone/tenderness to L glut    ASSESSMENT/PLAN  Updated problem list and treatment plan: Diagnosis 1:  LBP/SI instabiltiy  Pain -  hot/cold therapy, manual therapy and home program  Decreased ROM/flexibility - manual therapy and therapeutic exercise  Decreased joint mobility -   manual therapy and therapeutic exercise  Decreased strength - therapeutic exercise and therapeutic activities  Impaired balance - neuro re-education and therapeutic activities  Decreased proprioception - neuro re-education and therapeutic activities  Inflammation - cold therapy and self management/home program  Impaired gait - gait training  Impaired muscle performance - neuro re-education  Decreased function - therapeutic activities  Re: Shelby Alves   :    1955    STG/LTGs have been met or progress has been made towards goals:  Yes (See Goal flow sheet completed today.)  Assessment of Progress: The patient's condition has exacerbated.  Self Management Plans:  Patient has been instructed in a home treatment program.  Patient  has been instructed in self management of symptoms.  I have re-evaluated this patient and find that the nature, scope, duration and intensity of the therapy is appropriate for the medical condition of the patient.  Shelby continues to require the following intervention to meet STG and LTG's:  PT    Recommendations:  Pt will try being independent with her care.  She will return only if needed.        Thank you for your referral.    INQUIRIES  Therapist: Brianna Tyler, PT, ATC   [address] [address]  Phone: [phone]  Fax: [phone]

## 2020-12-22 NOTE — PROGRESS NOTES
Subjective:  HPI  Physical Exam                    Objective:  System    Physical Exam    General     ROS    Assessment/Plan:    PROGRESS  REPORT    Progress reporting period is from 11/9/20 to 12/22/20.       SUBJECTIVE  Subjective changes noted by patient:  Random SI instability continues to occur.  Sunday morning she got a leg cramp, jumped out of bed, and felt the SI shift out.  Sharp pain at first but it has eased some.       Current pain level is 2-3/10  .    Initial Pain level: 3/10.   Changes in function:  Yes (See Goal flowsheet attached for changes in current functional level)  Adverse reaction to treatment or activity: None    OBJECTIVE  -Lumbar AROM normal with L glut pain with extension and R SB  -+ L ilial upslip and anterior rotation again; corrects easily with MET and instructed her  in this technique today  -Sig tone/tenderness to L glut          ASSESSMENT/PLAN  Updated problem list and treatment plan: Diagnosis 1:  LBP/SI instabiltiy  Pain -  hot/cold therapy, manual therapy and home program  Decreased ROM/flexibility - manual therapy and therapeutic exercise  Decreased joint mobility -   manual therapy and therapeutic exercise  Decreased strength - therapeutic exercise and therapeutic activities  Impaired balance - neuro re-education and therapeutic activities  Decreased proprioception - neuro re-education and therapeutic activities  Inflammation - cold therapy and self management/home program  Impaired gait - gait training  Impaired muscle performance - neuro re-education  Decreased function - therapeutic activities  STG/LTGs have been met or progress has been made towards goals:  Yes (See Goal flow sheet completed today.)  Assessment of Progress: The patient's condition has exacerbated.  Self Management Plans:  Patient has been instructed in a home treatment program.  Patient  has been instructed in self management of symptoms.  I have re-evaluated this patient and find that the nature,  scope, duration and intensity of the therapy is appropriate for the medical condition of the patient.  Shelby continues to require the following intervention to meet STG and LTG's:  PT    Recommendations:  Pt will try being independent with her care.  She will return only if needed.    Please refer to the daily flowsheet for treatment today, total treatment time and time spent performing 1:1 timed codes.

## 2021-02-23 PROBLEM — M51.369 DDD (DEGENERATIVE DISC DISEASE), LUMBAR: Status: RESOLVED | Noted: 2020-10-01 | Resolved: 2021-02-23

## 2021-02-23 PROBLEM — M79.671 PAIN OF RIGHT HEEL: Status: RESOLVED | Noted: 2020-10-15 | Resolved: 2021-02-23

## 2021-02-23 PROBLEM — M53.3 SI (SACROILIAC) JOINT DYSFUNCTION: Status: RESOLVED | Noted: 2020-10-01 | Resolved: 2021-02-23

## 2021-03-29 ENCOUNTER — APPOINTMENT (OUTPATIENT)
Dept: URBAN - METROPOLITAN AREA CLINIC 254 | Age: 66
Setting detail: DERMATOLOGY
End: 2021-03-29

## 2021-03-29 VITALS — RESPIRATION RATE: 14 BRPM | WEIGHT: 210 LBS | HEIGHT: 65 IN

## 2021-03-29 DIAGNOSIS — L81.4 OTHER MELANIN HYPERPIGMENTATION: ICD-10-CM

## 2021-03-29 DIAGNOSIS — L82.1 OTHER SEBORRHEIC KERATOSIS: ICD-10-CM

## 2021-03-29 DIAGNOSIS — L82.0 INFLAMED SEBORRHEIC KERATOSIS: ICD-10-CM

## 2021-03-29 PROCEDURE — 17110 DESTRUCT B9 LESION 1-14: CPT

## 2021-03-29 PROCEDURE — OTHER COUNSELING: OTHER

## 2021-03-29 PROCEDURE — OTHER LIQUID NITROGEN (COSMETIC): OTHER

## 2021-03-29 PROCEDURE — 99213 OFFICE O/P EST LOW 20 MIN: CPT | Mod: 25

## 2021-03-29 PROCEDURE — OTHER LIQUID NITROGEN: OTHER

## 2021-03-29 ASSESSMENT — LOCATION SIMPLE DESCRIPTION DERM
LOCATION SIMPLE: LEFT FOREHEAD
LOCATION SIMPLE: RIGHT FOREHEAD
LOCATION SIMPLE: CHEST
LOCATION SIMPLE: LEFT CHEEK
LOCATION SIMPLE: CHEST
LOCATION SIMPLE: SCALP
LOCATION SIMPLE: RIGHT CHEEK
LOCATION SIMPLE: LEFT CHEEK

## 2021-03-29 ASSESSMENT — LOCATION ZONE DERM
LOCATION ZONE: SCALP
LOCATION ZONE: FACE
LOCATION ZONE: FACE
LOCATION ZONE: TRUNK
LOCATION ZONE: TRUNK

## 2021-03-29 ASSESSMENT — LOCATION DETAILED DESCRIPTION DERM
LOCATION DETAILED: UPPER STERNUM
LOCATION DETAILED: RIGHT MEDIAL SUPERIOR CHEST
LOCATION DETAILED: LEFT MEDIAL MALAR CHEEK
LOCATION DETAILED: LEFT MEDIAL SUPERIOR CHEST
LOCATION DETAILED: LEFT SUPERIOR PARIETAL SCALP
LOCATION DETAILED: LEFT SUPERIOR LATERAL MALAR CHEEK
LOCATION DETAILED: RIGHT INFERIOR FOREHEAD
LOCATION DETAILED: LEFT MEDIAL FOREHEAD
LOCATION DETAILED: RIGHT LATERAL MALAR CHEEK

## 2021-03-29 NOTE — PROCEDURE: LIQUID NITROGEN (COSMETIC)
Last OV 9/6/19. Next OV 3/20/20.     Refill submitted.       
Post-Care Instructions: - Patient was instructed to avoid picking at any of the treated lesions.
Render Post-Care Instructions In Note?: yes
Billing Information: Bill by Static Price
Detail Level: Detailed
Price (Use Numbers Only, No Special Characters Or $): 75
Consent: - Verbal and written consent was obtained, and risks were reviewed prior to procedure today. \\n- Risks discussed include but are not limited to pain, crusting, scabbing, blistering, scarring, temporary or permanent darker or lighter pigmentary change, recurrence, incomplete resolution, and infection. \\n- The patient understands that the procedure is cosmetic in nature and is not covered by or billable to insurance.

## 2021-03-29 NOTE — HPI: SKIN LESION
How Severe Is Your Skin Lesion?: mild
Is This A New Presentation, Or A Follow-Up?: Skin Lesion
Additional History: Treated 1 year ago bu primary care provider and it resolved but grew back.

## 2021-03-29 NOTE — PROCEDURE: LIQUID NITROGEN
Post-Care Instructions: - Avoid picking at any of the treated lesions.
Medical Necessity Clause: This procedure was medically necessary because the lesions that were treated were:
Detail Level: Detailed
Render Post-Care Instructions In Note?: yes
Consent: - Verbal and written consent was obtained, and risks were reviewed prior to procedure today. \\n- Risks discussed include but are not limited to pain, crusting, scabbing, blistering, scarring, temporary or permanent darker or lighter pigmentary change, recurrence, incomplete resolution, and infection.
Include Z78.9 (Other Specified Conditions Influencing Health Status) As An Associated Diagnosis?: No
Medical Necessity Information: It is in your best interest to select a reason for this procedure from the list below. All of these items fulfill various CMS LCD requirements except the new and changing color options.

## 2022-03-24 ENCOUNTER — APPOINTMENT (OUTPATIENT)
Dept: URBAN - METROPOLITAN AREA CLINIC 254 | Age: 67
Setting detail: DERMATOLOGY
End: 2022-03-24

## 2022-03-24 DIAGNOSIS — L82.0 INFLAMED SEBORRHEIC KERATOSIS: ICD-10-CM

## 2022-03-24 DIAGNOSIS — L72.0 EPIDERMAL CYST: ICD-10-CM

## 2022-03-24 DIAGNOSIS — L82.1 OTHER SEBORRHEIC KERATOSIS: ICD-10-CM

## 2022-03-24 PROCEDURE — 17110 DESTRUCT B9 LESION 1-14: CPT

## 2022-03-24 PROCEDURE — OTHER LIQUID NITROGEN: OTHER

## 2022-03-24 PROCEDURE — OTHER COUNSELING: OTHER

## 2022-03-24 PROCEDURE — 99213 OFFICE O/P EST LOW 20 MIN: CPT | Mod: 25

## 2022-03-24 ASSESSMENT — LOCATION SIMPLE DESCRIPTION DERM
LOCATION SIMPLE: LEFT TEMPLE
LOCATION SIMPLE: LEFT LIP
LOCATION SIMPLE: RIGHT TEMPLE

## 2022-03-24 ASSESSMENT — LOCATION ZONE DERM
LOCATION ZONE: LIP
LOCATION ZONE: FACE

## 2022-03-24 ASSESSMENT — LOCATION DETAILED DESCRIPTION DERM
LOCATION DETAILED: LEFT UPPER CUTANEOUS LIP
LOCATION DETAILED: RIGHT CENTRAL TEMPLE
LOCATION DETAILED: LEFT LATERAL TEMPLE

## 2022-03-24 NOTE — PROCEDURE: LIQUID NITROGEN
Show Aperture Variable?: Yes
Post-Care Instructions: I reviewed with the patient in detail post-care instructions. Patient is to wear sunprotection, and avoid picking at any of the treated lesions. Pt may apply Vaseline to crusted or scabbing areas.
Include Z78.9 (Other Specified Conditions Influencing Health Status) As An Associated Diagnosis?: No
Medical Necessity Clause: This procedure was medically necessary because the lesions that were treated were:
Detail Level: Detailed
Medical Necessity Information: It is in your best interest to select a reason for this procedure from the list below. All of these items fulfill various CMS LCD requirements except the new and changing color options.
Spray Paint Text: The liquid nitrogen was applied to the skin utilizing a spray paint frosting technique.
Consent: The patient's consent was obtained including but not limited to risks of crusting, scabbing, blistering, scarring, darker or lighter pigmentary change, recurrence, incomplete removal and infection.